# Patient Record
Sex: MALE | URBAN - METROPOLITAN AREA
[De-identification: names, ages, dates, MRNs, and addresses within clinical notes are randomized per-mention and may not be internally consistent; named-entity substitution may affect disease eponyms.]

---

## 2024-05-14 ENCOUNTER — VIRTUAL VISIT (OUTPATIENT)
Dept: URGENT CARE | Facility: CLINIC | Age: 54
End: 2024-05-14
Payer: COMMERCIAL

## 2024-05-14 DIAGNOSIS — R05.1 ACUTE COUGH: Primary | ICD-10-CM

## 2024-05-14 PROCEDURE — 99203 OFFICE O/P NEW LOW 30 MIN: CPT | Mod: 95 | Performed by: EMERGENCY MEDICINE

## 2024-05-14 RX ORDER — AZITHROMYCIN 250 MG/1
TABLET, FILM COATED ORAL
Qty: 6 TABLET | Refills: 0 | Status: SHIPPED | OUTPATIENT
Start: 2024-05-14 | End: 2024-05-19

## 2024-05-14 RX ORDER — GUAIFENESIN 600 MG/1
600 TABLET, EXTENDED RELEASE ORAL 2 TIMES DAILY
Qty: 14 TABLET | Refills: 0 | Status: SHIPPED | OUTPATIENT
Start: 2024-05-14 | End: 2024-05-21

## 2024-05-14 NOTE — PROGRESS NOTES
Phone appointment:  Duration: 8 minutes        CHIEF COMPLAINT: Respiratory infection      HPI: Patient is a 53-year-old male whose been ill for 3 days.  Initially had a dry cough and fatigue.  Now he is feverish with some audible congestion.  No shortness of breath.  No history of asthma or any respiratory disease.  No COVID testing.      ROS: See HPI otherwise normal.    Not on File   Current Outpatient Medications   Medication Sig Dispense Refill    azithromycin (ZITHROMAX) 250 MG tablet Take 2 tablets (500 mg) by mouth daily for 1 day, THEN 1 tablet (250 mg) daily for 4 days. 6 tablet 0    guaiFENesin (MUCINEX) 600 MG 12 hr tablet Take 1 tablet (600 mg) by mouth 2 times daily for 7 days 14 tablet 0         PE: No acute distress on phone appointment although he does sound somewhat hoarse.  He is alert and oriented.  He is nondyspneic sounding speaking in full sentences.        TREATMENT: None.      ASSESSMENT: Respiratory infection, localized to chest without other viral URI symptoms.  No acute distress so outpatient treatment is reasonable.      DIAGNOSIS: Cough.      PLAN: Z-Saravanan, guaifenesin as instructed.  Lots of fluids.  Tylenol if fever.  Recheck 2 to 3 days if no better, sooner if worse.